# Patient Record
Sex: FEMALE | Race: WHITE
[De-identification: names, ages, dates, MRNs, and addresses within clinical notes are randomized per-mention and may not be internally consistent; named-entity substitution may affect disease eponyms.]

---

## 2015-02-23 VITALS
DIASTOLIC BLOOD PRESSURE: 79 MMHG | SYSTOLIC BLOOD PRESSURE: 114 MMHG | SYSTOLIC BLOOD PRESSURE: 114 MMHG | DIASTOLIC BLOOD PRESSURE: 79 MMHG | DIASTOLIC BLOOD PRESSURE: 79 MMHG | SYSTOLIC BLOOD PRESSURE: 114 MMHG

## 2015-05-20 VITALS — SYSTOLIC BLOOD PRESSURE: 92 MMHG | DIASTOLIC BLOOD PRESSURE: 51 MMHG

## 2017-04-07 ENCOUNTER — HOSPITAL ENCOUNTER (OUTPATIENT)
Dept: HOSPITAL 63 - LAB | Age: 42
Discharge: HOME | End: 2017-04-07
Attending: FAMILY MEDICINE
Payer: COMMERCIAL

## 2017-04-07 DIAGNOSIS — M79.89: Primary | ICD-10-CM

## 2017-04-07 DIAGNOSIS — N95.8: ICD-10-CM

## 2017-04-07 DIAGNOSIS — G47.8: ICD-10-CM

## 2017-04-07 LAB
ALBUMIN SERPL-MCNC: 4 G/DL (ref 3.4–5)
ALBUMIN/GLOB SERPL: 1.1 {RATIO} (ref 1–1.7)
ALP SERPL-CCNC: 73 U/L (ref 46–116)
ALT SERPL-CCNC: 26 U/L (ref 14–59)
ANION GAP SERPL CALC-SCNC: 11 MMOL/L (ref 6–14)
AST SERPL-CCNC: 16 U/L (ref 15–37)
BASOPHILS # BLD AUTO: 0.1 X10^3/UL (ref 0–0.2)
BASOPHILS NFR BLD: 1 % (ref 0–3)
BILIRUB SERPL-MCNC: 0.7 MG/DL (ref 0.2–1)
BUN/CREAT SERPL: 20 (ref 6–20)
CA-I SERPL ISE-MCNC: 18 MG/DL (ref 7–20)
CALCIUM SERPL-MCNC: 8.9 MG/DL (ref 8.5–10.1)
CHLORIDE SERPL-SCNC: 105 MMOL/L (ref 98–107)
CO2 SERPL-SCNC: 27 MMOL/L (ref 21–32)
CREAT SERPL-MCNC: 0.9 MG/DL (ref 0.6–1)
CRP SERPL-MCNC: 1.7 MG/L (ref 0–3.3)
EOSINOPHIL NFR BLD: 0.3 X10^3/UL (ref 0–0.7)
EOSINOPHIL NFR BLD: 5 % (ref 0–3)
ERYTHROCYTE [DISTWIDTH] IN BLOOD BY AUTOMATED COUNT: 13.5 % (ref 11.5–14.5)
ERYTHROCYTE [SEDIMENTATION RATE] IN BLOOD: 4 MM/H (ref 0–25)
GFR SERPLBLD BASED ON 1.73 SQ M-ARVRAT: 68.7 ML/MIN
GLOBULIN SER-MCNC: 3.7 G/DL (ref 2.2–3.8)
GLUCOSE SERPL-MCNC: 90 MG/DL (ref 70–99)
HCT VFR BLD CALC: 43.5 % (ref 36–47)
HGB BLD-MCNC: 14.4 G/DL (ref 12–15.5)
LYMPHOCYTES # BLD: 1.8 X10^3/UL (ref 1–4.8)
LYMPHOCYTES NFR BLD AUTO: 35 % (ref 24–48)
MCH RBC QN AUTO: 28 PG (ref 25–35)
MCHC RBC AUTO-ENTMCNC: 33 G/DL (ref 31–37)
MCV RBC AUTO: 84 FL (ref 79–100)
MONO #: 0.5 X10^3/UL (ref 0–1.1)
MONOCYTES NFR BLD: 9 % (ref 0–9)
NEUT #: 2.6 X10^3UL (ref 1.8–7.7)
NEUTROPHILS NFR BLD AUTO: 50 % (ref 31–73)
PLATELET # BLD AUTO: 209 X10^3/UL (ref 140–400)
POTASSIUM SERPL-SCNC: 3.8 MMOL/L (ref 3.5–5.1)
PROT SERPL-MCNC: 7.7 G/DL (ref 6.4–8.2)
RBC # BLD AUTO: 5.18 X10^6/UL (ref 3.5–5.4)
SODIUM SERPL-SCNC: 143 MMOL/L (ref 136–145)
WBC # BLD AUTO: 5.2 X10^3/UL (ref 4–11)

## 2017-04-07 PROCEDURE — 80053 COMPREHEN METABOLIC PANEL: CPT

## 2017-04-07 PROCEDURE — 85027 COMPLETE CBC AUTOMATED: CPT

## 2017-04-07 PROCEDURE — 83001 ASSAY OF GONADOTROPIN (FSH): CPT

## 2017-04-07 PROCEDURE — 36415 COLL VENOUS BLD VENIPUNCTURE: CPT

## 2017-04-07 PROCEDURE — 86140 C-REACTIVE PROTEIN: CPT

## 2017-04-07 PROCEDURE — 85651 RBC SED RATE NONAUTOMATED: CPT

## 2017-04-07 PROCEDURE — 84443 ASSAY THYROID STIM HORMONE: CPT

## 2017-12-04 ENCOUNTER — HOSPITAL ENCOUNTER (OUTPATIENT)
Dept: HOSPITAL 63 - MAMMO | Age: 42
Discharge: HOME | End: 2017-12-04
Attending: FAMILY MEDICINE
Payer: COMMERCIAL

## 2017-12-04 DIAGNOSIS — Z12.31: Primary | ICD-10-CM

## 2017-12-04 PROCEDURE — 77063 BREAST TOMOSYNTHESIS BI: CPT

## 2017-12-04 NOTE — RAD
DATE: 12/4/2017



EXAM: MAMMO MORALES SCREENING BILATERAL



HISTORY: Routine screening



COMPARISON: 12/2/2016



This study was interpreted with the benefit of Computerized Aided Detection

(CAD).







FINDINGS:

Breast Density:  HETERO  The breast parenchyma Is heterogeneouslyy dense,

which could reduce sensitivity of mammography. Breast parenchyma level C. 

There is a small nodule identified in the right mid breast in its midportion

seen on the cc and MLO views.



      







IMPRESSION: Small nodule identified in the right mid breast. Recommend spot

compression views of the right breast in CC, MLO views and ultrasound of the

right breast.





BI-RADS CATEGORY: 0 INCOMPLETE:  NEEDS ADDITIONAL IMAGING EVALUATION AND/OR

PRIOR MAMMOGRAMS FOR COMPARISON.



RECOMMENDED FOLLOW-UP: ADD ADDITIONAL IMAGING



PQRS compliance statement: Patient information was entered into a reminder

system with a target due date immediate recall  for the next mammogram.



Mammography is a sensitive method for finding small breast cancers, but it

does not detect them all and is not a substitute for careful clinical

examination.  A negative mammogram does not negate a clinically suspicious

finding and should not result in delay in biopsying a clinically suspicious

abnormality.



"Our facility is accredited by the American College [employer] Mammography

Program."

## 2017-12-13 ENCOUNTER — HOSPITAL ENCOUNTER (OUTPATIENT)
Dept: HOSPITAL 63 - MAMMO | Age: 42
Discharge: HOME | End: 2017-12-13
Attending: FAMILY MEDICINE
Payer: COMMERCIAL

## 2017-12-13 DIAGNOSIS — N63.10: Primary | ICD-10-CM

## 2017-12-13 PROCEDURE — 76641 ULTRASOUND BREAST COMPLETE: CPT

## 2017-12-13 NOTE — RAD
DATE: 12/13/2017   



EXAM: DIGITAL DIAGNOSTIC RT, BREAST RIGHT



HISTORY: Suspicious screening study   



COMPARISON: 12/4/2017   



This study was interpreted with the benefit of Computerized Aided Detection

(CAD).





The breast parenchyma is heterogeneously dense, which could reduce sensitivity

of mammography. Breast parenchyma level C.





FINDINGS: Spot compression CC and straight mediolateral views of the right

breast were obtained and compared with the screening study. On the screening

study there is a smooth 4 mm nodule identified in the right breast located

just lateral to the midline. It was best seen on the 3-D images. It is only

faintly visualized on the current spot compression cc views due to obscuration

by overlying heterogeneous fibroglandular shadows. It is not clearly

visualized on the 2-D ML or oblique views.





Right breast ultrasound, 12/13/2017:



A targeted ultrasound exam of the right breast was performed. At the 10:00

location approximately 3 cm from the nipple there is a small rounded

hypoechoic nodule. Its margins are smooth. There is good through transmission.

There are low level internal echoes. No internal color flow is seen. This is

probably a complicated cyst. This appears to correspond in size and location

to the nodule seen on the mammograms.



At the 2:00 location in the right breast there is an additional small

hypoechoic nodule. It measures 7 x 6 x 4 mm. Its margins are slightly

lobulated and minimally angulated. It is wider than tall. No definite

posterior acoustic enhancement or shadowing is seen. There are low level

internal echoes. No definite internal color flow is seen. This is probably a

fibroadenoma or complicated cyst. Although a circumscribed malignancy is much

less likely, the angulated margins of this nodule are of some concern.

Ultrasound-guided biopsy is suggested.



No other abnormality was identified centrally in the right breast.





IMPRESSION: 

1. Small 10:00 right breast nodule which is probably a complicated cyst.

Sonographic follow-up beginning in 6 months is suggested to establish

stability.

2. Mildly suspicious 2:00 right breast nodule as described above.

Ultrasound-guided biopsy is suggested for further evaluation.







Note: The ultrasound technologist will notify the patient of these

recommendations, including the recommendation for biopsy, later today.





BI-RADS CATEGORY: 4 SUSPICIOUS ABNORMALITY- BIOPSY SHOULD BE CONSIDERED



RECOMMENDED FOLLOW-UP: BIO BIOPSY RECOMMENDED



PQRS compliance statement: Patient information was entered into a reminder

system with a target due date     for the next mammogram.



Mammography is a sensitive method for finding small breast cancers, but it

does not detect them all and is not a substitute for careful clinical

examination.  A negative mammogram does not negate a clinically suspicious

finding and should not result in delay in biopsying a clinically suspicious

abnormality.



"Our facility is accredited by the American College of [employer] Mammography

Program."

## 2017-12-20 ENCOUNTER — HOSPITAL ENCOUNTER (OUTPATIENT)
Dept: HOSPITAL 61 - US | Age: 42
Discharge: HOME | End: 2017-12-20
Payer: COMMERCIAL

## 2017-12-20 DIAGNOSIS — N63.10: Primary | ICD-10-CM

## 2017-12-20 PROCEDURE — 19081 BX BREAST 1ST LESION STRTCTC: CPT

## 2017-12-20 PROCEDURE — 88305 TISSUE EXAM BY PATHOLOGIST: CPT

## 2017-12-20 PROCEDURE — 76942 ECHO GUIDE FOR BIOPSY: CPT

## 2017-12-20 PROCEDURE — C1713 ANCHOR/SCREW BN/BN,TIS/BN: HCPCS

## 2017-12-20 NOTE — RAD
History:  Mass right breast.



Relative benefits risks and alternatives to the procedure were discussed and

written informed consent was obtained.

With sterile technique, local anesthesia and ultrasound guidance, percutaneous

biopsy was performed with a 14-gauge needle and multiple passes were obtained

through the target.

A marker was placed, and post procedure CC and ML views were obtained.

The procedure was well tolerated. Specimens were sent to pathology.

The patient was sent home in good condition with written and verbal

instructions.



Impression:  Status post ultrasound guided breast biopsy.



Pathology is pending.

## 2017-12-21 NOTE — PATHOLOGY
PATHOLOGY REPORT 

 

*    *    *    *    *    *    *    * 

 FINAL DIAGNOSIS:

Breast tissue, right breast mass needle biopsies: 

- Fibroadenoma.

 

COMMENT:

There is no evidence of malignancy. 

(JPM:deng; 2017) 

 

 

REPORT ELECTRONICALLY SIGNED BY:   Ambrose Muñiz M.D.

DATE/TIME:   2017 17:01

*    *    *    *    *    *    *    *

 

GROSS PATHOLOGY:

Received in formalin labeled "Kaz Ray, right breast," are multiple

needle cores of yellow-gray fibrofatty tissue measuring 2.3 x 0.6 x

0.2 cm in aggregate dimensions.  The tissue is submitted in its

entirety in cassette A1 through A3.  The cold ischemic time is 2

minutes.  The total formalin fixation time is 9 hours and 35 minutes.

(TSD; 2017)

 

 

 INITIAL CPT CODE(S):

A; 51789

Professional services performed by [facility] at 

[place] [address]

 

  Technical services performed by [facility] at [address] [place] [address].

Dr. Miramontes, [facility] Radiology fax [phone]  

 SPECIMEN(S) RECEIVED:

A.Right breast mass @ 2 o'clock

 

 CLINICAL HISTORY:

Right breast mass @ 2 o'clock

 

 PATIENT:  KAZ RAY

/AGE:  1975 (Age: 42)  

PATIENT #:  [id number]

ACCESSION #:  [id number]          ALT CASE #:   

SPECIMEN COLLECTION DATE:  2017

SPECIMEN RECEIVED DATE:  2017

   

[facility] - [address] [place] [address] - PHONE: 

[phone]

* * *  END OF REPORT  * * *

## 2018-05-01 ENCOUNTER — HOSPITAL ENCOUNTER (EMERGENCY)
Dept: HOSPITAL 63 - ER | Age: 43
Discharge: HOME | End: 2018-05-01
Payer: COMMERCIAL

## 2018-05-01 VITALS
SYSTOLIC BLOOD PRESSURE: 110 MMHG | SYSTOLIC BLOOD PRESSURE: 110 MMHG | SYSTOLIC BLOOD PRESSURE: 110 MMHG | DIASTOLIC BLOOD PRESSURE: 55 MMHG | DIASTOLIC BLOOD PRESSURE: 55 MMHG | DIASTOLIC BLOOD PRESSURE: 55 MMHG | DIASTOLIC BLOOD PRESSURE: 55 MMHG | DIASTOLIC BLOOD PRESSURE: 55 MMHG | SYSTOLIC BLOOD PRESSURE: 110 MMHG | SYSTOLIC BLOOD PRESSURE: 110 MMHG

## 2018-05-01 VITALS — WEIGHT: 162 LBS | HEIGHT: 72 IN | BODY MASS INDEX: 21.94 KG/M2

## 2018-05-01 DIAGNOSIS — Z90.710: ICD-10-CM

## 2018-05-01 DIAGNOSIS — R31.29: ICD-10-CM

## 2018-05-01 DIAGNOSIS — R94.5: ICD-10-CM

## 2018-05-01 DIAGNOSIS — K52.9: Primary | ICD-10-CM

## 2018-05-01 DIAGNOSIS — K21.9: ICD-10-CM

## 2018-05-01 DIAGNOSIS — K31.84: ICD-10-CM

## 2018-05-01 DIAGNOSIS — Z90.49: ICD-10-CM

## 2018-05-01 LAB
ALBUMIN SERPL-MCNC: 3.5 G/DL (ref 3.4–5)
ALP SERPL-CCNC: 134 U/L (ref 46–116)
ALT SERPL-CCNC: 86 U/L (ref 14–59)
AMPHETAMINE/METHAMPHETAMINE: (no result)
AMYLASE SERPL-CCNC: 37 U/L (ref 25–115)
ANION GAP SERPL CALC-SCNC: 12 MMOL/L (ref 6–14)
APTT PPP: (no result) S
AST SERPL-CCNC: 145 U/L (ref 15–37)
BACTERIA #/AREA URNS HPF: 0 /HPF
BARBITURATES UR-MCNC: (no result) UG/ML
BASOPHILS # BLD AUTO: 0 X10^3/UL (ref 0–0.2)
BASOPHILS NFR BLD: 0 % (ref 0–3)
BENZODIAZ UR-MCNC: (no result) UG/L
BILIRUB DIRECT SERPL-MCNC: 0.5 MG/DL (ref 0–0.2)
BILIRUB SERPL-MCNC: 1.4 MG/DL (ref 0.2–1)
BILIRUB UR QL STRIP: (no result)
CA-I SERPL ISE-MCNC: 16 MG/DL (ref 7–20)
CALCIUM SERPL-MCNC: 8.3 MG/DL (ref 8.5–10.1)
CANNABINOIDS UR-MCNC: (no result) UG/L
CHLORIDE SERPL-SCNC: 105 MMOL/L (ref 98–107)
CO2 SERPL-SCNC: 23 MMOL/L (ref 21–32)
COCAINE UR-MCNC: (no result) NG/ML
CREAT SERPL-MCNC: 0.8 MG/DL (ref 0.6–1)
EOSINOPHIL NFR BLD: 0.2 X10^3/UL (ref 0–0.7)
EOSINOPHIL NFR BLD: 3 % (ref 0–3)
ERYTHROCYTE [DISTWIDTH] IN BLOOD BY AUTOMATED COUNT: 13.6 % (ref 11.5–14.5)
FIBRINOGEN PPP-MCNC: (no result) MG/DL
GFR SERPLBLD BASED ON 1.73 SQ M-ARVRAT: 78.3 ML/MIN
GLUCOSE SERPL-MCNC: 100 MG/DL (ref 70–99)
GLUCOSE UR STRIP-MCNC: (no result) MG/DL
HCT VFR BLD CALC: 44.8 % (ref 36–47)
HGB BLD-MCNC: 15.3 G/DL (ref 12–15.5)
LIPASE: 143 U/L (ref 73–393)
LYMPHOCYTES # BLD: 1.3 X10^3/UL (ref 1–4.8)
LYMPHOCYTES NFR BLD AUTO: 20 % (ref 24–48)
MAGNESIUM SERPL-MCNC: 1.8 MG/DL (ref 1.8–2.4)
MCH RBC QN AUTO: 28 PG (ref 25–35)
MCHC RBC AUTO-ENTMCNC: 34 G/DL (ref 31–37)
MCV RBC AUTO: 82 FL (ref 79–100)
METHADONE SERPL-MCNC: (no result) NG/ML
MONO #: 0.5 X10^3/UL (ref 0–1.1)
MONOCYTES NFR BLD: 7 % (ref 0–9)
NEUT #: 4.6 X10^3UL (ref 1.8–7.7)
NEUTROPHILS NFR BLD AUTO: 70 % (ref 31–73)
NITRITE UR QL STRIP: (no result)
OPIATES UR-MCNC: (no result) NG/ML
PCP SERPL-MCNC: (no result) MG/DL
PLATELET # BLD AUTO: 220 X10^3/UL (ref 140–400)
POTASSIUM SERPL-SCNC: 4 MMOL/L (ref 3.5–5.1)
PROT SERPL-MCNC: 6.5 G/DL (ref 6.4–8.2)
RBC # BLD AUTO: 5.47 X10^6/UL (ref 3.5–5.4)
RBC #/AREA URNS HPF: (no result) /HPF (ref 0–2)
SODIUM SERPL-SCNC: 140 MMOL/L (ref 136–145)
SP GR UR STRIP: 1.02
SQUAMOUS #/AREA URNS LPF: (no result) /LPF
UROBILINOGEN UR-MCNC: 0.2 MG/DL
WBC # BLD AUTO: 6.5 X10^3/UL (ref 4–11)
WBC #/AREA URNS HPF: 0 /HPF (ref 0–4)

## 2018-05-01 PROCEDURE — 96374 THER/PROPH/DIAG INJ IV PUSH: CPT

## 2018-05-01 PROCEDURE — 84484 ASSAY OF TROPONIN QUANT: CPT

## 2018-05-01 PROCEDURE — 83690 ASSAY OF LIPASE: CPT

## 2018-05-01 PROCEDURE — 83880 ASSAY OF NATRIURETIC PEPTIDE: CPT

## 2018-05-01 PROCEDURE — 85025 COMPLETE CBC W/AUTO DIFF WBC: CPT

## 2018-05-01 PROCEDURE — 80307 DRUG TEST PRSMV CHEM ANLYZR: CPT

## 2018-05-01 PROCEDURE — 93005 ELECTROCARDIOGRAM TRACING: CPT

## 2018-05-01 PROCEDURE — 99285 EMERGENCY DEPT VISIT HI MDM: CPT

## 2018-05-01 PROCEDURE — 80048 BASIC METABOLIC PNL TOTAL CA: CPT

## 2018-05-01 PROCEDURE — 74021 RADEX ABDOMEN 3+ VIEWS: CPT

## 2018-05-01 PROCEDURE — G0479 DRUG TEST PRESUMP NOT OPT: HCPCS

## 2018-05-01 PROCEDURE — 96375 TX/PRO/DX INJ NEW DRUG ADDON: CPT

## 2018-05-01 PROCEDURE — 96376 TX/PRO/DX INJ SAME DRUG ADON: CPT

## 2018-05-01 PROCEDURE — 85610 PROTHROMBIN TIME: CPT

## 2018-05-01 PROCEDURE — 83735 ASSAY OF MAGNESIUM: CPT

## 2018-05-01 PROCEDURE — 82150 ASSAY OF AMYLASE: CPT

## 2018-05-01 PROCEDURE — 80076 HEPATIC FUNCTION PANEL: CPT

## 2018-05-01 PROCEDURE — 36415 COLL VENOUS BLD VENIPUNCTURE: CPT

## 2018-05-01 PROCEDURE — 84443 ASSAY THYROID STIM HORMONE: CPT

## 2018-05-01 PROCEDURE — 82553 CREATINE MB FRACTION: CPT

## 2018-05-01 PROCEDURE — 85379 FIBRIN DEGRADATION QUANT: CPT

## 2018-05-01 PROCEDURE — 85730 THROMBOPLASTIN TIME PARTIAL: CPT

## 2018-05-01 PROCEDURE — 81001 URINALYSIS AUTO W/SCOPE: CPT

## 2018-05-01 PROCEDURE — 96361 HYDRATE IV INFUSION ADD-ON: CPT

## 2018-05-01 PROCEDURE — S0028 INJECTION, FAMOTIDINE, 20 MG: HCPCS

## 2018-05-01 PROCEDURE — 71046 X-RAY EXAM CHEST 2 VIEWS: CPT

## 2018-05-01 NOTE — EKG
[facility] [address], [place] [address]

Test Date:    2018               Test Time:    05:39:40

Pat Name:     EVETTE DEL CASTILLO               Department:   

Patient ID:   [id number]           Room:          

Gender:       F                        Technician:   JANEEN

:          1975               Requested By: MORTEZA FLORES

Order Number: [id number]            Reading MD:     

                                 Measurements

Intervals                              Axis          

Rate:         86                       P:            17

IN:           132                      QRS:          81

QRSD:         86                       T:            60

QT:           352                                    

QTc:          424                                    

                           Interpretive Statements

SINUS RHYTHM

NORMAL ECG

RI6.01

No previous ECG available for comparison

## 2018-05-01 NOTE — ED.ADGEN
Past History


Past Medical History:  Gallstones, GERD, Other


 (MORTEZA FLORES MD)


Past Surgical History:  Cholecystectomy, Hysterectomy, Other


 (MORTEZA FLORES MD)


Alcohol Use:  None


Drug Use:  None


 (MORTEZA FLORES MD)





Adult General


Chief Complaint


Chief Complaint


".. The pain is really bad... it been constant since about 1.. Nausea .. 

Vomiting.. it goes thru. to my back.. "


 (MORTEZA FLORES MD)





HPI


HPI





Patient is a 42 year old female who presents with above hx and complaints of 

chest/ epigastric  pain rated 10/10 since one am.  Pt. has hx of gastritis and 

gastroparesis.  Has episodes in past similar related to gastroparesis.   Pt. 

denies any bad food.  Did eat 1./2 hamburger at 1800 hrs.   No hx of bloody 

stools.  No travel or specific ill contacts.   Pt. hx. trauma.   No hx of  

pancreatitis or cardiac disorder.   Pt. in obvious  severe discomfort.  Assumes 

a Tripod position.  Pt. very diaphoretic.   Pt. normally follows with Dr. Grover. 


 (MORTEZA FLORES MD)





Review of Systems


Review of Systems





Constitutional: Denies fever or chills []


Eyes: Denies change in visual acuity, redness, or eye pain []


HENT: Denies nasal congestion or sore throat []


Respiratory: Denies cough or shortness of breath []


Cardiovascular: No additional information not addressed in HPI []


GI: Severe abdominal pain, nausea, vomiting,. Denies bloody stools or diarrhea [

]


: Denies dysuria or hematuria []


Musculoskeletal: Denies back pain or joint pain []


Integument: Denies rash or skin lesions []


Neurologic: Denies headache, focal weakness or sensory changes []


Endocrine: Denies polyuria or polydipsia []





All other systems were reviewed and found to be within normal limits, except as 

documented in this note.


 (MORTEZA FLORES MD)





Family History


Family History


Noncontributory


 (MORTEZA FLORES MD)





Current Medications


Current Medications





Current Medications








 Medications


  (Trade)  Dose


 Ordered  Sig/Ester  Start Time


 Stop Time Status Last Admin


Dose Admin


 


 Aspirin


  (Children'S


 Aspirin)  324 mg  1X  ONCE  5/1/18 06:00


 5/1/18 06:01 DC 5/1/18 06:18


324 MG


 


 Famotidine


  (Pepcid Vial)  20 mg  1X  ONCE  5/1/18 06:00


 5/1/18 06:01 DC 5/1/18 06:16


20 MG


 


 Metoclopramide HCl


  (Reglan Vial)  10 mg  1X  ONCE  5/1/18 09:00


 5/1/18 09:01 DC 5/1/18 08:55


10 MG


 


 Morphine Sulfate


  (Morphine 4mg


 Syringe)  4 mg  1X  ONCE  5/1/18 09:00


 5/1/18 09:01 DC 5/1/18 08:54


4 MG


 


 Ondansetron HCl


  (Zofran)  8 mg  1X  ONCE  5/1/18 06:00


 5/1/18 06:01 DC 5/1/18 06:16


8 MG


 


 Sodium Chloride  1,000 ml @ 


 1,000 mls/hr  1X  ONCE  5/1/18 08:30


 5/1/18 09:29  5/1/18 08:22


1,000 MLS/HR





 (LILIAM DEAN MD)





Allergies


Allergies





Allergies








Coded Allergies Type Severity Reaction Last Updated Verified


 


  No Known Drug Allergies    2/22/15 No





 (LILIAM DEAN MD)





Physical Exam


Physical Exam





Constitutional: in acute distress, ill in appearance. []


HENT: Normocephalic, atraumatic, bilateral external ears normal, oropharynx 

moist, no oral exudates, nose normal. []


Eyes: PERRLA, EOMI, conjunctiva normal, no discharge. [] 


Neck: Normal range of motion, no tenderness, supple, no stridor. [] 


Cardiovascular:Heart rate regular rhythm, no murmur []


Lungs & Thorax:  Bilateral breath sounds equal at apex on auscultation []


Abdomen: Bowel sounds decreased, soft, epigastric tenderness, no masses, no 

pulsatile masses. [] Old surgical scars.


Skin: Warm, dry, no erythema, no rash. [] 


Back: No tenderness, no CVA tenderness. [] 


Extremities: No tenderness, no cyanosis, no clubbing, ROM intact, no edema. [] 

No cording noted. 


Neurologic: Alert and oriented X 3, normal motor function, normal sensory 

function, no focal deficits noted. []


Psychologic: Affect anxious,  judgement normal, mood depressed. 


 (MORTEZA FLORES MD)





Current Patient Data


Vital Signs





 Vital Signs








  Date Time  Temp Pulse Resp B/P (MAP) Pulse Ox O2 Delivery O2 Flow Rate FiO2


 


5/1/18 06:16   22  98 Room Air  


 


5/1/18 05:31 97.7 81      





 (LILIAM DEAN MD)


Lab Results





 Laboratory Tests








Test


  5/1/18


05:45 5/1/18


07:27 5/1/18


08:05


 


White Blood Count


  6.5 x10^3/uL


(4.0-11.0) 


  


 


 


Red Blood Count


  5.47 x10^6/uL


(3.50-5.40)  H 


  


 


 


Hemoglobin


  15.3 g/dL


(12.0-15.5) 


  


 


 


Hematocrit


  44.8 %


(36.0-47.0) 


  


 


 


Mean Corpuscular Volume


  82 fL ()


  


  


 


 


Mean Corpuscular Hemoglobin 28 pg (25-35)    


 


Mean Corpuscular Hemoglobin


Concent 34 g/dL


(31-37) 


  


 


 


Red Cell Distribution Width


  13.6 %


(11.5-14.5) 


  


 


 


Platelet Count


  220 x10^3/uL


(140-400) 


  


 


 


Neutrophils (%) (Auto) 70 % (31-73)    


 


Lymphocytes (%) (Auto) 20 % (24-48)  L  


 


Monocytes (%) (Auto) 7 % (0-9)    


 


Eosinophils (%) (Auto) 3 % (0-3)    


 


Basophils (%) (Auto) 0 % (0-3)    


 


Neutrophils # (Auto)


  4.6 x10^3uL


(1.8-7.7) 


  


 


 


Lymphocytes # (Auto)


  1.3 x10^3/uL


(1.0-4.8) 


  


 


 


Monocytes # (Auto)


  0.5 x10^3/uL


(0.0-1.1) 


  


 


 


Eosinophils # (Auto)


  0.2 x10^3/uL


(0.0-0.7) 


  


 


 


Basophils # (Auto)


  0.0 x10^3/uL


(0.0-0.2) 


  


 


 


Prothrombin Time


  11.0 SEC


(9.4-11.4) 


  


 


 


Prothrombin Time INR 1.1 (0.9-1.1)    


 


PTT


  22 SEC (23-33)


L 


  


 


 


D-Dimer (Stephenie)


  0.36 mg/L


(0.00-0.50) 


  


 


 


Troponin I Quantitative


  < 0.017 ng/mL


(0-0.055) 


  


 


 


Sodium Level


  


  140 mmol/L


(136-145) 


 


 


Potassium Level


  


  4.0 mmol/L


(3.5-5.1) 


 


 


Chloride Level


  


  105 mmol/L


() 


 


 


Carbon Dioxide Level


  


  23 mmol/L


(21-32) 


 


 


Anion Gap  12 (6-14)   


 


Blood Urea Nitrogen


  


  16 mg/dL


(7-20) 


 


 


Creatinine


  


  0.8 mg/dL


(0.6-1.0) 


 


 


Estimated GFR


(Cockcroft-Gault) 


  78.3  


  


 


 


Glucose Level


  


  100 mg/dL


(70-99)  H 


 


 


Calcium Level


  


  8.3 mg/dL


(8.5-10.1)  L 


 


 


Magnesium Level


  


  1.8 mg/dL


(1.8-2.4) 


 


 


Total Bilirubin


  


  1.4 mg/dL


(0.2-1.0)  H 


 


 


Direct Bilirubin


  


  0.5 mg/dL


(0.0-0.2)  H 


 


 


Aspartate Amino Transferase


(AST) 


  145 U/L


(15-37)  H 


 


 


Alanine Aminotransferase (ALT)


  


  86 U/L (14-59)


H 


 


 


Alkaline Phosphatase


  


  134 U/L


()  H 


 


 


Creatine Kinase


  


  52 U/L


() 


 


 


Creatine Kinase MB (Mass)


  


  0.5 ng/mL


(0.0-3.6) 


 


 


Creatine Kinase MB Relative


Index 


  1.0 % (0-4)  


  


 


 


NT-Pro-B-Type Natriuretic


Peptide 


  35 pg/mL


(0-124) 


 


 


Total Protein


  


  6.5 g/dL


(6.4-8.2) 


 


 


Albumin


  


  3.5 g/dL


(3.4-5.0) 


 


 


Amylase Level


  


  37 U/L


() 


 


 


Lipase


  


  143 U/L


() 


 


 


Urine Collection Type   Unknown  


 


Urine Color   Bianca  


 


Urine Clarity   Hazy  


 


Urine pH   6.0  


 


Urine Specific Gravity   1.020  


 


Urine Protein


  


  


  Neg


(NEG-TRACE)


 


Urine Glucose (UA)


  


  


  Neg mg/dL


(NEG)


 


Urine Ketones (Stick)


  


  


  15 mg/dL (NEG)


 


 


Urine Blood   Neg (NEG)  


 


Urine Nitrite   Neg (NEG)  


 


Urine Bilirubin   Neg (NEG)  


 


Urine Urobilinogen Dipstick


  


  


  0.2 mg/dL (0.2


mg/dL)


 


Urine Leukocyte Esterase   Neg (NEG)  


 


Urine RBC


  


  


  3-5 /HPF (0-2)


 


 


Urine WBC   0 /HPF (0-4)  


 


Urine Squamous Epithelial


Cells 


  


  Few /LPF  


 


 


Urine Bacteria


  


  


  0 /HPF (0-FEW)


 


 


Urine Mucus   Mod /LPF  


 


Urine Opiates Screen   Pos (NEG)  


 


Urine Methadone Screen   Neg (NEG)  


 


Urine Barbiturates   Neg (NEG)  


 


Urine Phencyclidine Screen   Neg (NEG)  


 


Urine


Amphetamine/Methamphetamine 


  


  Neg (NEG)  


 


 


Urine Benzodiazepines Screen   Neg (NEG)  


 


Urine Cocaine Screen   Neg (NEG)  


 


Urine Cannabinoids Screen   Neg (NEG)  


 


Urine Ethyl Alcohol   Neg (NEG)  





 (LILIAM DEAN MD)


Lab Results





 Laboratory Tests








Test


  5/1/18


05:45


 


White Blood Count


  6.5 x10^3/uL


(4.0-11.0)


 


Red Blood Count


  5.47 x10^6/uL


(3.50-5.40)  H


 


Hemoglobin


  15.3 g/dL


(12.0-15.5)


 


Hematocrit


  44.8 %


(36.0-47.0)


 


Mean Corpuscular Volume


  82 fL ()


 


 


Mean Corpuscular Hemoglobin 28 pg (25-35)  


 


Mean Corpuscular Hemoglobin


Concent 34 g/dL


(31-37)


 


Red Cell Distribution Width


  13.6 %


(11.5-14.5)


 


Platelet Count


  220 x10^3/uL


(140-400)


 


Neutrophils (%) (Auto) 70 % (31-73)  


 


Lymphocytes (%) (Auto) 20 % (24-48)  L


 


Monocytes (%) (Auto) 7 % (0-9)  


 


Eosinophils (%) (Auto) 3 % (0-3)  


 


Basophils (%) (Auto) 0 % (0-3)  


 


Neutrophils # (Auto)


  4.6 x10^3uL


(1.8-7.7)


 


Lymphocytes # (Auto)


  1.3 x10^3/uL


(1.0-4.8)


 


Monocytes # (Auto)


  0.5 x10^3/uL


(0.0-1.1)


 


Eosinophils # (Auto)


  0.2 x10^3/uL


(0.0-0.7)


 


Basophils # (Auto)


  0.0 x10^3/uL


(0.0-0.2)


 


Prothrombin Time


  11.0 SEC


(9.4-11.4)


 


Prothrombin Time INR 1.1 (0.9-1.1)  


 


PTT


  22 SEC (23-33)


L


 


D-Dimer (Stephenie)


  0.36 mg/L


(0.00-0.50)


 


Troponin I Quantitative


  < 0.017 ng/mL


(0-0.055)








 (MORTEZA FLORES MD)





EKG


EKG


My interpretation of EKG shows a sinus rate at 86.  No findings of acute STEMI 

with contra lateral changes.   Wavering baseline.  []


 (MORTEZA FLORES MD)





Radiology/Procedures


Radiology/Procedures


[]


 (MORTEZA FLORES MD)





Course & Med Decision Making


Course & Med Decision Making


Pertinent Labs and Imaging studies reviewed. (See chart for details). 





Discussed presentation, testing with Dr. Dean at shift change.  She will make 

disposition of pt. 





[]


 (MORTEZA FLORES MD)


Course & Med Decision Making


Evaluation of patient in ER showed 42-year-old female patient with history of 

gastroparesis and complaining of epigastric pain and nausea and vomiting and 

diarrhea that getting worse since 1 AM. She had sick contacts at home. Patient 

treated with morphine and IV fluid and Zofran by previous emergency room 

physician and felt better at the time I evaluated her. Patient had unremarkable 

EKG and chest and abdomen x-ray and labs except for mild elevation of liver 

function tests. Treated with 2 L of IV fluids and 2 doses of morphine and 

Zofran and tolerated oral intake. She didn't want to have pain medication for 

home. Prescription for Zofran was given. Patient instructed to take liquid diet 

and follow up with her primary care physician regarding elevation of liver 

function tests and continue home medication. 


 (LILIAM DEAN MD)


Final Impression


Final Impression


1. Chest Pain


2. Epigastric Pain


[]


Problems:   (MORTEZA FLORES MD)





Dragon Disclaimer


Dragon Disclaimer


This electronic medical record was generated, in whole or in part, using a 

voice recognition dictation system.


 (MORTEZA FLORES MD)





Departure:


Impression:  


 Primary Impression:  


 Acute gastroenteritis


 Additional Impressions:  


 Nondiabetic gastroparesis


 Abnormal liver function tests


 Microscopic hematuria


Disposition:  01 HOME, SELF-CARE (At 0908)


Condition:  IMPROVED


Patient Instructions:  Gastroparesis, Viral Gastroenteritis





Additional Instructions:  


Drink plenty of liquids


Follow-up with your primary care physician in 3-5 days


Return to ER if not getting better


Scripts


Ondansetron (ZOFRAN ODT) 4 Mg Tab.rapdis


1 TAB SL Q8HRS, #20 TAB


   Prov: LILIAM DEAN MD         5/1/18











MORTEZA FLORES MD May 1, 2018 05:36


LILIAM DEAN MD May 1, 2018 09:11

## 2018-05-01 NOTE — RAD
Chest, 2 views, 5/1/2018:



History: Chest and abdominal pain with nausea and vomiting



The heart size and pulmonary vascularity are normal.  There is minimal

scarring over the pulmonary apices.  No acute infiltrate is seen.  There is no

evidence of pleural fluid.



IMPRESSION: No acute cardiopulmonary abnormality is detected.







Abdomen, 2 views, 5/1/2018:



There is a paucity of gas in the GI tract.  A couple of small nonspecific

air-fluid levels are noted.  No free air is seen in the abdomen.  Surgical

clips in the upper quadrant are compatible with a previous cholecystectomy. 

There are surgical sutures in the lower pelvis bilaterally.  There is no

evidence of organomegaly.



IMPRESSION: No acute abdominal abnormality is detected.

## 2018-08-24 ENCOUNTER — HOSPITAL ENCOUNTER (OUTPATIENT)
Dept: HOSPITAL 63 - MAMMO | Age: 43
Discharge: HOME | End: 2018-08-24
Attending: FAMILY MEDICINE
Payer: COMMERCIAL

## 2018-08-24 DIAGNOSIS — Z83.49: ICD-10-CM

## 2018-08-24 DIAGNOSIS — R92.8: Primary | ICD-10-CM

## 2018-08-24 DIAGNOSIS — K21.9: ICD-10-CM

## 2018-08-24 DIAGNOSIS — Z90.49: ICD-10-CM

## 2018-08-24 DIAGNOSIS — E78.5: ICD-10-CM

## 2018-08-24 DIAGNOSIS — Z90.710: ICD-10-CM

## 2018-08-24 PROCEDURE — 77065 DX MAMMO INCL CAD UNI: CPT

## 2018-08-24 NOTE — RAD
DATE: 8/24/2018



EXAM: MAMMO MORALES DIAG RT



HISTORY: Follow-up breast nodule



COMPARISON: 12/4/2017



This study was interpreted with the benefit of Computerized Aided Detection

(CAD).





The breast parenchyma is heterogeneously dense, which could reduce sensitivity

of mammography. Breast parenchyma level C.





FINDINGS: 2-D and 3-D tomosynthesis imaging was performed in CC and MLO

projections.  There has been interval placement of a breast biopsy marker

medially in the right breast.  The pathology results from that biopsy were

reportedly benign.  There is a 4 mm nodule located just lateral to the midline

of the right breast which is unchanged since the previous study.  This is best

demonstrated on CC tomosynthesis images #32.  No new or enlarging breast

densities are seen.  No suspicious microcalcifications are evident.  





IMPRESSION: Stable right mammograms.  Follow-up bilateral mammography in one

year is suggested.





BI-RADS CATEGORY: 3 PROBABLY BENIGN FINDING(S)-SHORT INTERVAL FOLLOW-UP

SUGGESTED



RECOMMENDED FOLLOW-UP: 6M 6 MONTH FOLLOW-UP



PQRS compliance statement: Patient information was entered into a reminder

system with a target due date     for the next mammogram.



Mammography is a sensitive method for finding small breast cancers, but it

does not detect them all and is not a substitute for careful clinical

examination.  A negative mammogram does not negate a clinically suspicious

finding and should not result in delay in biopsying a clinically suspicious

abnormality.



"Our facility is accredited by the American [employer] Mammography

Program."

## 2018-10-23 ENCOUNTER — HOSPITAL ENCOUNTER (OUTPATIENT)
Dept: HOSPITAL 63 - RAD | Age: 43
Discharge: HOME | End: 2018-10-23
Attending: FAMILY MEDICINE
Payer: COMMERCIAL
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
## 2018-10-23 DIAGNOSIS — H57.13: ICD-10-CM
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
## 2018-10-23 DIAGNOSIS — S06.0X0A: Primary | ICD-10-CM

## 2018-10-23 PROCEDURE — 70486 CT MAXILLOFACIAL W/O DYE: CPT

## 2018-10-23 PROCEDURE — 70450 CT HEAD/BRAIN W/O DYE: CPT

## 2018-10-23 NOTE — RAD
PQRS Compliance Statement:

 

One or more of the following individualized dose reduction techniques were

utilized for this examination:  

1. Automated exposure control  

2. Adjustment of the mA and/or kV according to patient size  

3. Use of iterative reconstruction technique 

 

 

CT HEAD AND MAXILLOFACIAL WITHOUT CONTRAST

 

History: FALL TEN DAYS AGO, CONTINUED PAIN IN LEFT EYE

 

Comparison: None.

 

Procedure: Axial images are obtained of the head from the skull base 

through the vertex without IV contrast. Helical CT imaging of the facial 

bones is performed without IV contrast.

 

Findings: 

The ventricles and sulci are normal for the patient's age. 

No mass-effect, midline shift, hemorrhage or obvious acute infarction is 

identified.  Basilar cisterns are patent.  

 

Bone windows demonstrate no significant calvarial abnormality. 

 

No acute facial bone fracture. The globes and orbits are intact.

Mastoid air cells are well aerated. Bilateral dental cavities are noted. 

Minimal mucosal thickening bilateral maxillary sinuses. There is no 

air-fluid level. Ostiomeatal complexes are patent.

 

IMPRESSION:

1. No acute intracranial abnormality.  

2. No acute facial bone fracture.

 

Electronically signed by: Mc Joe MD (10/23/2018 10:39 AM) [id number]

## 2019-02-25 ENCOUNTER — HOSPITAL ENCOUNTER (OUTPATIENT)
Dept: HOSPITAL 63 - MAMMO | Age: 44
Discharge: HOME | End: 2019-02-25
Attending: FAMILY MEDICINE
Payer: COMMERCIAL

## 2019-02-25 DIAGNOSIS — N63.11: Primary | ICD-10-CM

## 2019-02-25 PROCEDURE — 76641 ULTRASOUND BREAST COMPLETE: CPT

## 2019-02-25 PROCEDURE — 77066 DX MAMMO INCL CAD BI: CPT

## 2019-02-25 NOTE — RAD
DATE: February 25, 2019



EXAM: MAMMO MORALES VÁZQUEZ, BREAST RIGHT



HISTORY: History of benign right breast biopsy.  Follow-up study.



COMPARISON: August 24, 2018 and December 4, 2017



2-D digital mammographic views of both breasts were performed in the CC and

MLO projections. 3-D digital tomosynthesis images of both breasts were

performed in the CC and MLO projections and reviewed on a computer

workstation.



This study was interpreted with the benefit of Computerized Aided Detection

(CAD).







FINDINGS:



Breast Density: DENSE The breast parenchyma is dense, which could reduce the

sensitivity of mammography. Breast parenchyma level density D..  Again seen is

a biopsy clip of the medial aspect of the right breast.  Again seen is a small

nodule just lateral to the nipple line of the right breast in the CC

projection which is stable.  There is a new nodule located at 12:00 position

of the right breast 4 cm the nipple.  The left breast is stable.



RIGHT BREAST SONOGRAPHY: High-resolution sonography of the 12:00 position of

the right breast was performed.  Dilated ducts are apparent.  There is focal

dilatation of one of these ducts measuring up to 14 mm in size which accounts

for the mammographic finding.  There is another prominent focal ductal

dilatation measuring up to 10 mm in size.  These are benign findings.







IMPRESSION: Benign findings of the right breast.  No mammographic indicators

for malignancy.







BI-RADS CATEGORY: 2 BENIGN FINDING



RECOMMENDED FOLLOW-UP: 12M 12 MONTH FOLLOW-UP



PQRS compliance statement: Patient information was entered into a reminder

system with a target due date February 26, 2020 for the next mammogram.



Mammography is a sensitive method for finding small breast cancers, but it

does not detect them all and is not a substitute for careful clinical

examination.  A negative mammogram does not negate a clinically suspicious

finding and should not result in delay in biopsying a clinically suspicious

abnormality.



"Our facility is accredited by the [employer] Mammography

Program."



The patient's breast density may affect the ability of mammography to detect

breast cancer. There are 4 categories of breast density, A, B, C and D. Breast

density A means that most of the breast tissue is replaced with adipose tissue

and therefore is not dense. Breast density B means that the breast tissue is

mildly dense and scattered. Breast density C means that the breast tissue is

heterogeneously dense. Breast density D means that the breast tissue is very

dense. Breast densities especially C and D may decrease the sensitivity of

mammography to detect breast cancer. Therefore, the patient may benefit from

3-D breast mammography (3D breast tomography) as a part of their screening

mammogram. Insurance may or may not pay for this additional imaging. The

patient's breast density based on today's mammogram is category C.

## 2020-07-31 ENCOUNTER — HOSPITAL ENCOUNTER (OUTPATIENT)
Dept: HOSPITAL 63 - MAMMO | Age: 45
Discharge: HOME | End: 2020-07-31
Attending: FAMILY MEDICINE
Payer: COMMERCIAL

## 2020-07-31 DIAGNOSIS — Z12.31: Primary | ICD-10-CM

## 2020-07-31 PROCEDURE — 77067 SCR MAMMO BI INCL CAD: CPT

## 2020-07-31 PROCEDURE — 77063 BREAST TOMOSYNTHESIS BI: CPT

## 2020-08-04 NOTE — RAD
DATE: 7/31/2020 12:53 PM



EXAM: MAMMO MORALES SCREENING BILATERAL



HISTORY:  Screening



COMPARISON: 2/25/2019 and 12/4/2017



Bilateral CC and MLO views of the breasts were performed. Bilateral breast

tomosynthesis was performed in CC and MLO projections.



This study was interpreted with the benefit of Computerized Aided Detection

(CAD).





FINDINGS:



Breast Density: HETERO  The breast parenchyma Is heterogeneously dense, which

could reduce sensitivity of mammography. Breast parenchyma level C





No suspicious masses, microcalcifications or architectural distortion is

present to suggest malignancy in either breast.





The visualized axillae are unremarkable. 



IMPRESSION: No mammographic evidence of malignancy. 



BI-RADS CATEGORY: 1 NEGATIVE



RECOMMENDED FOLLOW-UP: 12M 12 MONTH FOLLOW-UP Annual screening mammography is

recommended, unless clinically indicated sooner based on symptoms or change in

physical exam.



PQRS compliance statement: Patient information was entered into a reminder

system with a target due date for the next mammogram.



Mammography is a sensitive method for finding small breast cancers, but it

does not detect them all and is not a substitute for careful clinical

examination.  A negative mammogram does not negate a clinically suspicious

finding and should not result in delay in biopsying a clinically suspicious

abnormality.



"Our facility is accredited by the [employer] Mammography

Program."